# Patient Record
Sex: MALE | URBAN - METROPOLITAN AREA
[De-identification: names, ages, dates, MRNs, and addresses within clinical notes are randomized per-mention and may not be internally consistent; named-entity substitution may affect disease eponyms.]

---

## 2020-08-20 ENCOUNTER — DOCUMENTATION ONLY (OUTPATIENT)
Dept: MATERNAL FETAL MEDICINE | Facility: CLINIC | Age: 45
End: 2020-08-20

## 2020-08-20 DIAGNOSIS — Z31.440 ENCOUNTER OF MALE FOR TESTING FOR GENETIC DISEASE CARRIER STATUS FOR PROCREATIVE MANAGEMENT: Primary | ICD-10-CM

## 2020-08-20 DIAGNOSIS — Z31.440 ENCOUNTER OF MALE FOR TESTING FOR GENETIC DISEASE CARRIER STATUS FOR PROCREATIVE MANAGEMENT: ICD-10-CM

## 2020-08-20 PROCEDURE — 36415 COLL VENOUS BLD VENIPUNCTURE: CPT | Performed by: GENETIC COUNSELOR, MS

## 2020-08-20 PROCEDURE — 40000954 ZZHCL STATISTIC COUNSYL FAMILY PREP: Performed by: GENETIC COUNSELOR, MS

## 2020-08-20 NOTE — PROGRESS NOTES
8/20/2020     Preston Coe accompanied his wife Matthieu Polanco to her appointment today in maternal fetal medicine.  Please see corresponding documentation for full details.  In order to clarify risks for her ongoing pregnancy for recessive and x-linked disorders, and determine appropriate management, Preston had his blood drawn for expanded carrier screening (Universal Panel, 175 conditions, through [employer]).  Appropriate consent for testing was obtained and Preston will be contacted to discuss results when they are available. Preston also completed a consent to communicate with his wife Collin regarding these results.       Shaan Eastman MS, [facility]  Licensed Genetic Counselor  Phone: [phone]  Pager: [phone]

## 2020-09-03 ENCOUNTER — TELEPHONE (OUTPATIENT)
Dept: MATERNAL FETAL MEDICINE | Facility: CLINIC | Age: 45
End: 2020-09-03

## 2020-09-03 LAB — LAB SCANNED RESULT: ABNORMAL

## 2020-09-04 NOTE — TELEPHONE ENCOUNTER
9/4/2020    Preston returned my call and we discussed his expanded carrier screening results.  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  Preston's spouse was tested simultaneously and was negative for CF, meaning that her ongoing pregnancy is at significantly reduced risk for being affected (residual risk 1/36,000).  Being a carrier is not expected to impact Preston's health in any way, but there is a 50% chance for each child he has to be a carrier as well.  Preston had no questions at this time and was encouraged to remain in contact as needed moving forward.     Shaan Eastman MS, [facility]  Licensed Genetic Counselor  Phone: [phone]  Pager: [phone]

## 2021-01-04 ENCOUNTER — HEALTH MAINTENANCE LETTER (OUTPATIENT)
Age: 46
End: 2021-01-04

## 2021-10-11 ENCOUNTER — HEALTH MAINTENANCE LETTER (OUTPATIENT)
Age: 46
End: 2021-10-11

## 2022-01-30 ENCOUNTER — HEALTH MAINTENANCE LETTER (OUTPATIENT)
Age: 47
End: 2022-01-30

## 2022-09-24 ENCOUNTER — HEALTH MAINTENANCE LETTER (OUTPATIENT)
Age: 47
End: 2022-09-24

## 2023-05-08 ENCOUNTER — HEALTH MAINTENANCE LETTER (OUTPATIENT)
Age: 48
End: 2023-05-08